# Patient Record
Sex: FEMALE | Race: WHITE | NOT HISPANIC OR LATINO | URBAN - NONMETROPOLITAN AREA
[De-identification: names, ages, dates, MRNs, and addresses within clinical notes are randomized per-mention and may not be internally consistent; named-entity substitution may affect disease eponyms.]

---

## 2017-01-02 NOTE — PATIENT DISCUSSION
Considering Surgery Counseling: I have discussed the option of scheduling surgery versus following, as well as the risks, benefits and alternatives of cataract surgery with the patient. It was explained that the surgery is elective, there is no rush and there is no harm in waiting to have surgery. It was also explained that there is no guarantee that removing the cataract will improve their vision. The patient understands and desires to follow for now and will consider his/her options. Patient to call back to schedule preop if desires cataract surgery.

## 2017-01-02 NOTE — PATIENT DISCUSSION
Glaucoma Suspect Counseling:  I have explained to the patient at length glaucoma potentially causes loss of peripheral vision due to damage to the optic nerve. I have explained that damage to the optic nerve from glaucoma is irreversible and that the available treatments for glaucoma are designed to prevent further damage if we determine glaucoma is present. I have explained the importance of regular follow-up with dilated eye exams to monitor for possible progression.

## 2017-01-02 NOTE — PATIENT DISCUSSION
CATARACTS, OU - VISUALLY SIGNIFICANT. PT TO CALL WHEN READY TO PROCEED WITH SURGERY. SCHEDULE OS__  FIRST THEN LATER IN OD__  IF VISUAL SYMPTOMS PERSIST.

## 2017-01-10 NOTE — PATIENT DISCUSSION
Surgery Counseling: I have discussed the option of scheduling cataract surgery versus following, as well as the risks, benefits and alternatives of surgery with the patient. It was explained that the surgery is elective, there is no rush and there is no harm in waiting to have surgery. It was also explained that there is no guarantee that removing the cataract will improve their vision. The patient understands and desires to proceed with cataract surgery with implantation of an intraocular lens  to improve vision for _driving________. I have given the patient the prescribed regimen of the all-in-one drop to use before and after cataract surgery. They have elected to use the all-in-one option of Pred/Gati/Nepaf(prednisolone acetate, gatifloxacin, and nepafenac). Patient to administer as directed.

## 2017-01-27 NOTE — PATIENT DISCUSSION
Post-Op Instructions: The patient was instructed in the proper use of post-operative eye drop: pred-moxi-ketor in the surgical eye 3 times per day for 3 weeks, then discontinue. Call back instructions, retinal detachment and endophthalmitis precautions given.

## 2017-02-08 NOTE — PATIENT DISCUSSION
S/P PE IOL, _os__. DOING WELL. CONTINUE PRED-GATI-NEPAF IN THE SURGICAL EYE  FOR A TOTAL OF 3 WEEKS USE THEN DISCONTINUE. SCHEDULE 2ND EYE CATARACT SURGERY.

## 2017-02-08 NOTE — PATIENT DISCUSSION
Pre-Op 2nd Eye Counseling: The patient has noticed an improvement in their visual symptoms in the operative eye. The patient complains of decreased vision in the fellow eye when __driving and reading_. It was explained to the patient that the decision to proceed with cataract surgery in the fellow eye is entirely a separate decision from the surgical eye. All of the same risks, benefits and alternatives are reviewed with the patient again. The patient does feel the vision in the non-operative eye is limiting their daily activities and elects to proceed with cataract surgery in the __right_ eye. . I have given the patient the prescribed regimen of the all-in-one drop to use before and after cataract surgery. They have elected to use the all-in-one option of Pred/Gati/Nepaf(prednisolone acetate, gatifloxacin, and nepafenac). Patient to administer as directed.

## 2019-05-09 ENCOUNTER — IMPORTED ENCOUNTER (OUTPATIENT)
Dept: URBAN - NONMETROPOLITAN AREA CLINIC 1 | Facility: CLINIC | Age: 69
End: 2019-05-09

## 2019-05-09 PROBLEM — H52.4: Noted: 2019-05-09

## 2019-05-09 PROBLEM — H25.13: Noted: 2019-05-09

## 2019-05-09 PROBLEM — H16.223: Noted: 2019-05-09

## 2019-05-09 PROBLEM — H10.423: Noted: 2019-05-09

## 2019-05-09 PROBLEM — H52.13: Noted: 2019-05-09

## 2019-05-09 PROBLEM — H52.223: Noted: 2019-05-09

## 2019-05-09 PROCEDURE — 92014 COMPRE OPH EXAM EST PT 1/>: CPT

## 2019-05-09 PROCEDURE — 92015 DETERMINE REFRACTIVE STATE: CPT

## 2019-05-09 NOTE — PATIENT DISCUSSION
Compound Myopic Astigmatism OU w/ Presbyopia-  discussed findings w/patient-  new spectacle Rx issued update PRN OU -  monitor yearly or prn Nuclear Sclerosis OU -  discussed findings w/patient-  no treatment indicated at this time-  UV protection recommended-  monitor yearly or prn MAURICIO OU-  discussed findings w/patient-  start Refresh Con 3 PRN OU samples given-  recommend frequent breaks from computers-  monitor yearly or prn; 's Notes: MR 5/9/2019DFE 5/9/2019

## 2019-07-25 NOTE — PATIENT DISCUSSION
COUNSELING:
GLAUCOMA SUSPECT, OU : ENLARGED OPTIC NERVE CUPPING. RETURN FOR FOLLOW UP AS SCHEDULED.
General:
Glaucoma Suspect Counseling:  I have explained to the patient at length glaucoma potentially causes loss of peripheral vision due to damage to the optic nerve. I have explained that damage to the optic nerve from glaucoma is irreversible and that the available treatments for glaucoma are designed to prevent further damage if we determine glaucoma is present. I have explained the importance of regular follow-up with dilated eye exams to monitor for possible progression.
112

## 2020-09-22 ENCOUNTER — IMPORTED ENCOUNTER (OUTPATIENT)
Dept: URBAN - NONMETROPOLITAN AREA CLINIC 1 | Facility: CLINIC | Age: 70
End: 2020-09-22

## 2020-09-22 PROCEDURE — 92014 COMPRE OPH EXAM EST PT 1/>: CPT

## 2020-09-22 PROCEDURE — 92015 DETERMINE REFRACTIVE STATE: CPT

## 2020-09-22 NOTE — PATIENT DISCUSSION
Compound Myopic Astigmatism OU w/ Presbyopia-  discussed findings w/patient-  new spectacle Rx issued-  monitor yearly or prn Nuclear Sclerosis OU -  discussed findings w/patient-  no treatment indicated at this time-  UV protection recommended-  monitor yearly or prn MAURICIO OU/Ocular Allergies OU-  discussed findings w/patient-  continue Pataday BID OU PRN-  continue Refresh Con 3 PRN OU-  d/c Visine -  recommend frequent breaks from computers-  monitor yearly or prn; 's Notes: MR 9/22/2020DFE 9/22/2020

## 2021-09-23 ENCOUNTER — IMPORTED ENCOUNTER (OUTPATIENT)
Dept: URBAN - NONMETROPOLITAN AREA CLINIC 1 | Facility: CLINIC | Age: 71
End: 2021-09-23

## 2021-09-23 ENCOUNTER — PREPPED CHART (OUTPATIENT)
Dept: URBAN - NONMETROPOLITAN AREA CLINIC 4 | Facility: CLINIC | Age: 71
End: 2021-09-23

## 2021-09-23 PROBLEM — H16.223: Noted: 2021-09-23

## 2021-09-23 PROBLEM — H52.4: Noted: 2021-09-23

## 2021-09-23 PROBLEM — H52.13: Noted: 2019-05-09

## 2021-09-23 PROBLEM — H25.13: Noted: 2021-09-23

## 2021-09-23 PROBLEM — H10.423: Noted: 2021-09-23

## 2021-09-23 PROCEDURE — 92014 COMPRE OPH EXAM EST PT 1/>: CPT

## 2021-09-23 PROCEDURE — 92015 DETERMINE REFRACTIVE STATE: CPT

## 2021-09-23 NOTE — PATIENT DISCUSSION
Simple Myopia OU w/ Presbyopia-  discussed findings w/patient-  new spectacle Rx issued-  monitor yearly or prn Nuclear Sclerosis OU -  discussed findings w/patient-  no treatment indicated at this time-  UV protection recommended-  monitor yearly or prn MAURICIO OU/Ocular Allergies OU-  discussed findings w/patient-  continue Pataday BID OU PRN-  continue Refresh Con 3 PRN OU-  recommend frequent breaks from computers-  monitor yearly or prn; 's Notes: MR 9/23/2021DFE 9/23/2021

## 2021-10-22 ENCOUNTER — IMPORTED ENCOUNTER (OUTPATIENT)
Dept: URBAN - NONMETROPOLITAN AREA CLINIC 1 | Facility: CLINIC | Age: 71
End: 2021-10-22

## 2021-10-22 NOTE — PATIENT DISCUSSION
Rx Check-  discussed findings w/patient-  doctor's Rx change-  continue to monitor as scheduled or prn; 's Notes: MR 10/22/2021DFE 9/23/2021

## 2022-02-14 ASSESSMENT — VISUAL ACUITY
OD_SC: 20/40
OD_PH: 20/25
OS_SC: 20/25-2

## 2022-02-14 ASSESSMENT — TONOMETRY
OD_IOP_MMHG: 13
OS_IOP_MMHG: 12

## 2022-04-09 ASSESSMENT — TONOMETRY
OD_IOP_MMHG: 13
OD_IOP_MMHG: 14
OD_IOP_MMHG: 14
OS_IOP_MMHG: 12
OS_IOP_MMHG: 14
OS_IOP_MMHG: 14

## 2022-04-09 ASSESSMENT — VISUAL ACUITY
OS_CC: 20/25-2
OD_SC: 20/25
OD_SC: 20/25
OD_SC: 20/20
OS_SC: 20/30+2
OS_SC: 20/20-2
OU_SC: J1+
OS_SC: 20/25+
OU_SC: 20/25
OD_CC: 20/40
OD_PH: 20/25

## 2022-10-19 ENCOUNTER — COMPREHENSIVE EXAM (OUTPATIENT)
Dept: URBAN - NONMETROPOLITAN AREA CLINIC 4 | Facility: CLINIC | Age: 72
End: 2022-10-19

## 2022-10-19 DIAGNOSIS — H52.4: ICD-10-CM

## 2022-10-19 DIAGNOSIS — H52.13: ICD-10-CM

## 2022-10-19 PROCEDURE — 92014 COMPRE OPH EXAM EST PT 1/>: CPT

## 2022-10-19 PROCEDURE — 92015 DETERMINE REFRACTIVE STATE: CPT

## 2022-10-19 ASSESSMENT — VISUAL ACUITY
OD_CC: 20/20
OU_CC: J1+
OU_CC: 20/20
OS_CC: 20/25+2

## 2022-10-19 ASSESSMENT — TONOMETRY
OS_IOP_MMHG: 12
OD_IOP_MMHG: 12

## 2022-10-19 NOTE — PATIENT DISCUSSION
Patient has been educated on the cataracts being visually significant, patient sounds as though she would wish to remain myopic, however, patient has been instructed to call when she feels that she is ready for a cataract evaluation.  Would recommend a Cat Eval.

## 2025-04-11 ENCOUNTER — COMPREHENSIVE EXAM (OUTPATIENT)
Age: 75
End: 2025-04-11

## 2025-04-11 DIAGNOSIS — H16.223: ICD-10-CM

## 2025-04-11 DIAGNOSIS — H52.13: ICD-10-CM

## 2025-04-11 DIAGNOSIS — H52.4: ICD-10-CM

## 2025-04-11 DIAGNOSIS — H25.13: ICD-10-CM

## 2025-04-11 PROCEDURE — 92015 DETERMINE REFRACTIVE STATE: CPT

## 2025-04-11 PROCEDURE — 92014 COMPRE OPH EXAM EST PT 1/>: CPT
